# Patient Record
Sex: FEMALE | Race: OTHER | NOT HISPANIC OR LATINO | URBAN - METROPOLITAN AREA
[De-identification: names, ages, dates, MRNs, and addresses within clinical notes are randomized per-mention and may not be internally consistent; named-entity substitution may affect disease eponyms.]

---
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
## 2023-05-17 NOTE — ED PROVIDER NOTE - CROS ED CONS ALL NEG
Goal Outcome Evaluation:           Progress: no change  Outcome Evaluation: pt up in chair, prn pain medications given at request, wound care done per order, no complaints from pt, will continue plan of care.          negative - no fever